# Patient Record
Sex: MALE | Race: WHITE | Employment: FULL TIME | URBAN - METROPOLITAN AREA
[De-identification: names, ages, dates, MRNs, and addresses within clinical notes are randomized per-mention and may not be internally consistent; named-entity substitution may affect disease eponyms.]

---

## 2024-08-14 ENCOUNTER — OFFICE VISIT (OUTPATIENT)
Age: 34
End: 2024-08-14

## 2024-08-14 VITALS
WEIGHT: 173.2 LBS | OXYGEN SATURATION: 97 % | SYSTOLIC BLOOD PRESSURE: 103 MMHG | HEART RATE: 78 BPM | TEMPERATURE: 97.9 F | DIASTOLIC BLOOD PRESSURE: 67 MMHG

## 2024-08-14 DIAGNOSIS — M70.31 BURSITIS OF RIGHT ELBOW, UNSPECIFIED BURSA: Primary | ICD-10-CM

## 2024-08-14 RX ORDER — PREDNISONE 20 MG/1
20 TABLET ORAL
Qty: 15 TABLET | Refills: 0 | Status: SHIPPED | OUTPATIENT
Start: 2024-08-14 | End: 2024-08-19

## 2024-08-14 ASSESSMENT — ENCOUNTER SYMPTOMS
COUGH: 0
SHORTNESS OF BREATH: 0
SORE THROAT: 0
VOMITING: 0
EYE REDNESS: 0
NAUSEA: 0

## 2024-08-14 NOTE — PROGRESS NOTES
Milena Leo (:  1990) is a 33 y.o. male,New patient, here for evaluation of the following chief complaint(s):  Elbow Swelling (Patient complains of RT elbow swelling that started this morning, states elbow started getting sore over the weekend.)      ASSESSMENT/PLAN:  1. Bursitis of right elbow, unspecified bursa    - predniSONE (DELTASONE) 20 MG tablet; Take 1 tablet by mouth 3 times daily (with meals) for 5 days  Dispense: 15 tablet; Refill: 0     -pt was advised to f/u with his PCP,return to UC or to the ER if worsening symptoms.  Return if symptoms worsen or fail to improve.    SUBJECTIVE/OBJECTIVE:  Pt c/o 3 day h/o mild elbow swelling,no injury      History provided by:  Patient      Vitals:    24 1123   BP: 103/67   Site: Right Upper Arm   Position: Sitting   Cuff Size: Large Adult   Pulse: 78   Temp: 97.9 °F (36.6 °C)   TempSrc: Oral   SpO2: 97%   Weight: 78.6 kg (173 lb 3.2 oz)       Review of Systems   Constitutional:  Negative for activity change, appetite change and fever.   HENT:  Negative for congestion and sore throat.    Eyes:  Negative for redness and visual disturbance.   Respiratory:  Negative for cough and shortness of breath.    Cardiovascular:  Negative for chest pain.   Gastrointestinal:  Negative for nausea and vomiting.   Skin:  Negative for rash.       Physical Exam  Constitutional:       General: He is not in acute distress.  HENT:      Mouth/Throat:      Mouth: Mucous membranes are moist.      Pharynx: No posterior oropharyngeal erythema.   Eyes:      Conjunctiva/sclera: Conjunctivae normal.      Pupils: Pupils are equal, round, and reactive to light.   Pulmonary:      Effort: Pulmonary effort is normal. No respiratory distress.      Breath sounds: Normal breath sounds.   Musculoskeletal:      Cervical back: Neck supple. No rigidity.      Comments: Mild bursitis of RT elbow.no warmness,has good ROM.   Lymphadenopathy:      Cervical: No cervical adenopathy.   Skin:

## 2025-02-14 ENCOUNTER — OFFICE VISIT (OUTPATIENT)
Age: 35
End: 2025-02-14

## 2025-02-14 VITALS
OXYGEN SATURATION: 99 % | BODY MASS INDEX: 23.63 KG/M2 | WEIGHT: 147 LBS | HEART RATE: 74 BPM | HEIGHT: 66 IN | SYSTOLIC BLOOD PRESSURE: 104 MMHG | DIASTOLIC BLOOD PRESSURE: 72 MMHG | TEMPERATURE: 98.4 F

## 2025-02-14 DIAGNOSIS — J02.9 PHARYNGITIS, UNSPECIFIED ETIOLOGY: Primary | ICD-10-CM

## 2025-02-14 LAB — S PYO AG THROAT QL: NORMAL

## 2025-02-14 RX ORDER — CEPHALEXIN 500 MG/1
500 CAPSULE ORAL 2 TIMES DAILY
Qty: 20 CAPSULE | Refills: 0 | Status: SHIPPED | OUTPATIENT
Start: 2025-02-14 | End: 2025-02-24

## 2025-02-14 RX ORDER — PREDNISONE 20 MG/1
20 TABLET ORAL 2 TIMES DAILY
Qty: 10 TABLET | Refills: 0 | Status: SHIPPED | OUTPATIENT
Start: 2025-02-14 | End: 2025-02-19

## 2025-02-14 ASSESSMENT — ENCOUNTER SYMPTOMS
GASTROINTESTINAL NEGATIVE: 1
COUGH: 0
SINUS PAIN: 0
SINUS PRESSURE: 0
TROUBLE SWALLOWING: 1
RHINORRHEA: 1
EYES NEGATIVE: 1

## 2025-02-14 NOTE — PATIENT INSTRUCTIONS
Take a medicine like acetaminophen (sample brand name: Tylenol) or ibuprofen (sample brand names: Advil, Motrin) to help bring down your fever or for discomfort.    If symptoms worsen, please go to the nearest emergency room.

## 2025-02-14 NOTE — PROGRESS NOTES
Milena Leo (:  1990) is a 34 y.o. male,Established patient, here for evaluation of the following chief complaint(s):  Pharyngitis (Sore throat , difficulty swallowing , runny nose x 3 days )      ASSESSMENT/PLAN:  1. Pharyngitis, unspecified etiology  - POCT rapid strep A      Results for orders placed or performed in visit on 25   POCT rapid strep A   Result Value Ref Range    Strep A Ag None Detected None Detected    - consulted with Dr. Falk, throat cx sent to lab, results pending, will change tx if indicated, will cover with Cephalexin.    - cephALEXin (KEFLEX) 500 MG capsule; Take 1 capsule by mouth 2 times daily for 10 days  Dispense: 20 capsule; Refill: 0    - predniSONE (DELTASONE) 20 MG tablet; Take 1 tablet by mouth 2 times daily for 5 days  Dispense: 10 tablet; Refill: 0  .     Return if symptoms worsen or fail to improve.    SUBJECTIVE/OBJECTIVE:  34 y.o. male with c/o Sore throat , difficulty swallowing , runny nose x 3 days that is continuing to worsen and has no relief with acetaminophen.       History provided by:  Patient      Vitals:    25 1456   BP: 104/72   Site: Right Upper Arm   Position: Sitting   Cuff Size: Small Adult   Pulse: 74   Temp: 98.4 °F (36.9 °C)   TempSrc: Temporal   SpO2: 99%   Weight: 66.7 kg (147 lb)   Height: 1.676 m (5' 6\")       Review of Systems   Constitutional:  Positive for activity change and fatigue. Negative for fever.   HENT:  Positive for rhinorrhea and trouble swallowing. Negative for drooling, sinus pressure and sinus pain.    Eyes: Negative.    Respiratory:  Negative for cough.    Gastrointestinal: Negative.        Physical Exam  Vitals and nursing note reviewed.   HENT:      Right Ear: Tympanic membrane normal.      Left Ear: Tympanic membrane normal.      Nose: Rhinorrhea present.      Mouth/Throat:      Pharynx: Oropharyngeal exudate and posterior oropharyngeal erythema (moderate erythema, moderate swelling) present.      Comments:

## 2025-02-16 LAB — BACTERIA THROAT AEROBE CULT: NORMAL

## 2025-02-18 LAB
BACTERIA THROAT AEROBE CULT: ABNORMAL
ORGANISM: ABNORMAL
ORGANISM: ABNORMAL

## 2025-02-28 ENCOUNTER — TELEPHONE (OUTPATIENT)
Age: 35
End: 2025-02-28

## 2025-02-28 NOTE — TELEPHONE ENCOUNTER
Spoke with patient about lab cx.  Pt feeling better. Advised that he was on appropriate medication. He reports he finished antibiotics and is feeling better.

## 2025-03-15 ENCOUNTER — OFFICE VISIT (OUTPATIENT)
Age: 35
End: 2025-03-15

## 2025-03-15 VITALS
BODY MASS INDEX: 27.97 KG/M2 | RESPIRATION RATE: 18 BRPM | WEIGHT: 174 LBS | SYSTOLIC BLOOD PRESSURE: 110 MMHG | DIASTOLIC BLOOD PRESSURE: 60 MMHG | OXYGEN SATURATION: 97 % | HEIGHT: 66 IN | TEMPERATURE: 98 F | HEART RATE: 87 BPM

## 2025-03-15 DIAGNOSIS — H66.92 ACUTE LEFT OTITIS MEDIA: Primary | ICD-10-CM

## 2025-03-15 RX ORDER — PREDNISONE 20 MG/1
40 TABLET ORAL DAILY
Qty: 10 TABLET | Refills: 0 | Status: SHIPPED | OUTPATIENT
Start: 2025-03-15 | End: 2025-03-20

## 2025-03-15 ASSESSMENT — ENCOUNTER SYMPTOMS
DIARRHEA: 0
VOMITING: 0
SORE THROAT: 0
COUGH: 0
RHINORRHEA: 0
ABDOMINAL PAIN: 0

## 2025-03-15 NOTE — PROGRESS NOTES
Milena Leo (:  1990) is a 34 y.o. male,Established patient, here for evaluation of the following chief complaint(s):  Ear Pain (Located in left ear. Ear pressure in both. Sxs started three days ago. Also chills and cough.)      ASSESSMENT/PLAN:  1. Acute left otitis media    - amoxicillin-clavulanate (AUGMENTIN) 875-125 MG per tablet; Take 1 tablet by mouth 2 times daily for 10 days  Dispense: 20 tablet; Refill: 0  - predniSONE (DELTASONE) 20 MG tablet; Take 2 tablets by mouth daily for 5 days  Dispense: 10 tablet; Refill: 0     -ear water precaution.Take Tylenol as needed.    Return if symptoms worsen or fail to improve.    SUBJECTIVE/OBJECTIVE:    History provided by:  Patient  Ear Pain   There is pain in the left ear. This is a new problem. The current episode started in the past 7 days. The problem has been gradually worsening. There has been no fever. The pain is moderate. Pertinent negatives include no abdominal pain, coughing, diarrhea, ear discharge, headaches, hearing loss, neck pain, rash, rhinorrhea, sore throat or vomiting.       Vitals:    03/15/25 1145 03/15/25 1150   BP: (!) 96/55 110/60   BP Site: Right Upper Arm    Patient Position: Sitting    Pulse: 87    Resp: 18    Temp: 98 °F (36.7 °C)    TempSrc: Oral    SpO2: 97%    Weight: 78.9 kg (174 lb)    Height: 1.676 m (5' 6\")        Review of Systems   HENT:  Positive for ear pain. Negative for ear discharge, hearing loss, rhinorrhea and sore throat.    Respiratory:  Negative for cough.    Gastrointestinal:  Negative for abdominal pain, diarrhea and vomiting.   Musculoskeletal:  Negative for neck pain.   Skin:  Negative for rash.   Neurological:  Negative for headaches.       Physical Exam  Constitutional:       General: He is not in acute distress.  HENT:      Right Ear: Tympanic membrane is not erythematous or bulging.      Left Ear: No drainage. A middle ear effusion is present. Tympanic membrane is erythematous.      Nose: No